# Patient Record
Sex: FEMALE | Race: WHITE | Employment: UNEMPLOYED | URBAN - METROPOLITAN AREA
[De-identification: names, ages, dates, MRNs, and addresses within clinical notes are randomized per-mention and may not be internally consistent; named-entity substitution may affect disease eponyms.]

---

## 2019-01-14 ENCOUNTER — APPOINTMENT (OUTPATIENT)
Dept: GENERAL RADIOLOGY | Age: 39
End: 2019-01-14

## 2019-01-14 ENCOUNTER — HOSPITAL ENCOUNTER (EMERGENCY)
Age: 39
Discharge: HOME | End: 2019-01-14

## 2019-01-14 VITALS
DIASTOLIC BLOOD PRESSURE: 61 MMHG | RESPIRATION RATE: 18 BRPM | SYSTOLIC BLOOD PRESSURE: 102 MMHG | WEIGHT: 170 LBS | OXYGEN SATURATION: 100 % | TEMPERATURE: 98.1 F | HEIGHT: 65 IN | HEART RATE: 84 BPM | BODY MASS INDEX: 28.32 KG/M2

## 2019-01-14 DIAGNOSIS — S46.011A ROTATOR CUFF STRAIN, RIGHT, INITIAL ENCOUNTER: Primary | ICD-10-CM

## 2019-01-14 PROCEDURE — 6370000000 HC RX 637 (ALT 250 FOR IP): Performed by: PHYSICIAN ASSISTANT

## 2019-01-14 PROCEDURE — 73030 X-RAY EXAM OF SHOULDER: CPT

## 2019-01-14 PROCEDURE — 99283 EMERGENCY DEPT VISIT LOW MDM: CPT

## 2019-01-14 RX ORDER — IBUPROFEN 600 MG/1
600 TABLET ORAL ONCE
Status: COMPLETED | OUTPATIENT
Start: 2019-01-14 | End: 2019-01-14

## 2019-01-14 RX ORDER — IBUPROFEN 600 MG/1
600 TABLET ORAL EVERY 6 HOURS PRN
Qty: 20 TABLET | Refills: 3 | Status: SHIPPED | OUTPATIENT
Start: 2019-01-14 | End: 2020-07-27 | Stop reason: ALTCHOICE

## 2019-01-14 RX ORDER — HYDROCODONE BITARTRATE AND ACETAMINOPHEN 5; 325 MG/1; MG/1
1 TABLET ORAL EVERY 6 HOURS PRN
Qty: 12 TABLET | Refills: 0 | Status: SHIPPED | OUTPATIENT
Start: 2019-01-14 | End: 2019-01-17

## 2019-01-14 RX ADMIN — IBUPROFEN 600 MG: 600 TABLET ORAL at 21:27

## 2019-01-14 ASSESSMENT — PAIN SCALES - GENERAL
PAINLEVEL_OUTOF10: 5
PAINLEVEL_OUTOF10: 5

## 2020-02-25 ENCOUNTER — TELEPHONE (OUTPATIENT)
Dept: CARDIOLOGY | Age: 40
End: 2020-02-25

## 2020-02-25 NOTE — TELEPHONE ENCOUNTER
LEFT MESSAGE TO SCHEDULE ECHO. EXPLAINED ON THE MESSAGE THAT PATIENT'S AUTH IS VALID UNTIL 3-20-20 PER DR. Scotty Wood OFFICE AND ASKED HER TO PLEASE CALL AS SOON AS POSSIBLE TO SET THIS ECHO UP.

## 2020-03-17 ENCOUNTER — TELEPHONE (OUTPATIENT)
Dept: CARDIOLOGY | Age: 40
End: 2020-03-17

## 2020-03-19 ENCOUNTER — HOSPITAL ENCOUNTER (OUTPATIENT)
Dept: CARDIOLOGY | Age: 40
Discharge: HOME | End: 2020-03-19
Payer: COMMERCIAL

## 2020-03-19 LAB
LV EF: 60 %
LVEF MODALITY: NORMAL

## 2020-03-19 PROCEDURE — 93306 TTE W/DOPPLER COMPLETE: CPT | Performed by: PSYCHIATRY & NEUROLOGY

## 2020-04-16 ENCOUNTER — TELEPHONE (OUTPATIENT)
Dept: CARDIOLOGY CLINIC | Age: 40
End: 2020-04-16

## 2020-04-16 NOTE — TELEPHONE ENCOUNTER
Due to the current COVID-19 pandemic, patient was offered virtual visit using a camera-enabled device (smart phone, tablet or computer) and reliable WiFi. Patient was notified that for purposes of billing, this is a virtual visit with their provider for which we will submit a claim for reimbursement with their insurance company. Patient was notified that they will be responsible for any copays, coinsurance amounts or other amounts not covered by their insurance company. Patient agreed to all of the above and visit is scheduled.

## 2020-04-17 ENCOUNTER — VIRTUAL VISIT (OUTPATIENT)
Dept: CARDIOLOGY CLINIC | Age: 40
End: 2020-04-17
Payer: COMMERCIAL

## 2020-04-17 VITALS — HEIGHT: 65 IN | BODY MASS INDEX: 30.82 KG/M2 | WEIGHT: 185 LBS

## 2020-04-17 PROCEDURE — 99244 OFF/OP CNSLTJ NEW/EST MOD 40: CPT | Performed by: INTERNAL MEDICINE

## 2020-04-21 ENCOUNTER — TELEPHONE (OUTPATIENT)
Dept: CARDIOLOGY | Age: 40
End: 2020-04-21

## 2020-04-23 ENCOUNTER — TELEPHONE (OUTPATIENT)
Dept: CARDIOLOGY | Age: 40
End: 2020-04-23

## 2020-04-27 ENCOUNTER — TELEPHONE (OUTPATIENT)
Dept: CARDIOLOGY | Age: 40
End: 2020-04-27

## 2020-04-27 ENCOUNTER — HOSPITAL ENCOUNTER (OUTPATIENT)
Dept: CARDIOLOGY | Age: 40
Discharge: HOME | End: 2020-04-27
Payer: COMMERCIAL

## 2020-04-27 VITALS
BODY MASS INDEX: 30.82 KG/M2 | HEIGHT: 65 IN | DIASTOLIC BLOOD PRESSURE: 68 MMHG | HEART RATE: 95 BPM | OXYGEN SATURATION: 98 % | SYSTOLIC BLOOD PRESSURE: 110 MMHG | WEIGHT: 185 LBS

## 2020-04-27 LAB
LV EF: 74 %
LVEF MODALITY: NORMAL

## 2020-04-27 PROCEDURE — 3430000000 HC RX DIAGNOSTIC RADIOPHARMACEUTICAL: Performed by: INTERNAL MEDICINE

## 2020-04-27 PROCEDURE — A9502 TC99M TETROFOSMIN: HCPCS | Performed by: INTERNAL MEDICINE

## 2020-04-27 PROCEDURE — 2580000003 HC RX 258: Performed by: INTERNAL MEDICINE

## 2020-04-27 PROCEDURE — 78452 HT MUSCLE IMAGE SPECT MULT: CPT

## 2020-04-27 PROCEDURE — 93017 CV STRESS TEST TRACING ONLY: CPT

## 2020-04-27 RX ORDER — SODIUM CHLORIDE 0.9 % (FLUSH) 0.9 %
10 SYRINGE (ML) INJECTION PRN
Status: DISCONTINUED | OUTPATIENT
Start: 2020-04-27 | End: 2020-04-28 | Stop reason: HOSPADM

## 2020-04-27 RX ADMIN — TETROFOSMIN 8.2 MILLICURIE: 0.23 INJECTION, POWDER, LYOPHILIZED, FOR SOLUTION INTRAVENOUS at 07:51

## 2020-04-27 RX ADMIN — SODIUM CHLORIDE, PRESERVATIVE FREE 10 ML: 5 INJECTION INTRAVENOUS at 07:51

## 2020-04-27 RX ADMIN — SODIUM CHLORIDE, PRESERVATIVE FREE 10 ML: 5 INJECTION INTRAVENOUS at 09:08

## 2020-04-27 RX ADMIN — TETROFOSMIN 26.9 MILLICURIE: 0.23 INJECTION, POWDER, LYOPHILIZED, FOR SOLUTION INTRAVENOUS at 09:08

## 2020-04-30 ENCOUNTER — TELEPHONE (OUTPATIENT)
Dept: CARDIOLOGY CLINIC | Age: 40
End: 2020-04-30

## 2020-05-14 ENCOUNTER — HOSPITAL ENCOUNTER (OUTPATIENT)
Dept: CT IMAGING | Age: 40
Discharge: HOME | End: 2020-05-16
Payer: COMMERCIAL

## 2020-05-14 ENCOUNTER — TELEPHONE (OUTPATIENT)
Dept: CARDIOLOGY CLINIC | Age: 40
End: 2020-05-14

## 2020-05-14 VITALS
SYSTOLIC BLOOD PRESSURE: 105 MMHG | RESPIRATION RATE: 19 BRPM | HEART RATE: 56 BPM | BODY MASS INDEX: 30.49 KG/M2 | DIASTOLIC BLOOD PRESSURE: 63 MMHG | HEIGHT: 65 IN | WEIGHT: 183 LBS | OXYGEN SATURATION: 98 %

## 2020-05-14 PROCEDURE — 75574 CT ANGIO HRT W/3D IMAGE: CPT

## 2020-05-14 PROCEDURE — 6370000000 HC RX 637 (ALT 250 FOR IP): Performed by: RADIOLOGY

## 2020-05-14 PROCEDURE — 6360000004 HC RX CONTRAST MEDICATION: Performed by: RADIOLOGY

## 2020-05-14 PROCEDURE — 2580000003 HC RX 258: Performed by: RADIOLOGY

## 2020-05-14 RX ORDER — METOPROLOL TARTRATE 50 MG/1
50 TABLET, FILM COATED ORAL
Status: COMPLETED | OUTPATIENT
Start: 2020-05-14 | End: 2020-05-14

## 2020-05-14 RX ORDER — NITROGLYCERIN 0.4 MG/1
0.4 TABLET SUBLINGUAL ONCE
Status: DISCONTINUED | OUTPATIENT
Start: 2020-05-14 | End: 2020-05-17 | Stop reason: HOSPADM

## 2020-05-14 RX ORDER — 0.9 % SODIUM CHLORIDE 0.9 %
1000 INTRAVENOUS SOLUTION INTRAVENOUS ONCE
Status: COMPLETED | OUTPATIENT
Start: 2020-05-14 | End: 2020-05-14

## 2020-05-14 RX ORDER — ACETAMINOPHEN 325 MG/1
650 TABLET ORAL ONCE
Status: COMPLETED | OUTPATIENT
Start: 2020-05-14 | End: 2020-05-14

## 2020-05-14 RX ADMIN — ACETAMINOPHEN 650 MG: 325 TABLET ORAL at 11:14

## 2020-05-14 RX ADMIN — IOPAMIDOL 84 ML: 755 INJECTION, SOLUTION INTRAVENOUS at 10:20

## 2020-05-14 RX ADMIN — SODIUM CHLORIDE 1000 ML: 9 INJECTION, SOLUTION INTRAVENOUS at 08:57

## 2020-05-14 RX ADMIN — METOPROLOL TARTRATE 50 MG: 50 TABLET, FILM COATED ORAL at 08:57

## 2020-05-14 ASSESSMENT — PAIN SCALES - GENERAL: PAINLEVEL_OUTOF10: 8

## 2020-05-14 ASSESSMENT — PAIN - FUNCTIONAL ASSESSMENT: PAIN_FUNCTIONAL_ASSESSMENT: 0-10

## 2020-05-14 NOTE — PROGRESS NOTES
Pt to department for coronary cta testing. Pt is alert and oriented, denies any chest pain or shortness of breath prior to scan. IV inserted, history and medications reviewed with patient. Medications and iv fluids given. Pt monitored for 1 hour, then taken to ct, connected to monitor. NTG held due to patient's blood pressure 90/53, heart rate 46. Pt scanned, pt very flushed after scan, states head felt \"funny\". Pt denies any itching, shortness of breath or chest pain. Pt's skin remained flushed for approx. 5 minutes. Pt brought back to holding bay in IR and monitored. Pt given cookies and water. Pt states \"funny\" feeling in head is better but has not resolved. Pt states \"my head feels foggy\" after 30 minutes post ct scan. Dr. Paulson made aware and advised to monitor an additional 30 minutes. [phone]) Pt then developed a headache rates 5/10 from temple to temple. Dr. Paulson notified and saw patient. ([phone]), tylenol ordered. Pt states \"funny\" feeling in head has resolved. Headache has subsided to a 4/10. Per Dr. Paulson pt is ok to be discharged. Pt ambulated with no difficulty. Iv removed and pt ambulatory out of department.

## 2020-07-01 ENCOUNTER — OFFICE VISIT (OUTPATIENT)
Dept: SURGERY | Age: 40
End: 2020-07-01
Payer: COMMERCIAL

## 2020-07-01 ENCOUNTER — HOSPITAL ENCOUNTER (OUTPATIENT)
Age: 40
Discharge: HOME | End: 2020-07-03
Payer: COMMERCIAL

## 2020-07-01 VITALS
TEMPERATURE: 98.4 F | HEART RATE: 61 BPM | OXYGEN SATURATION: 99 % | SYSTOLIC BLOOD PRESSURE: 110 MMHG | DIASTOLIC BLOOD PRESSURE: 78 MMHG | WEIGHT: 190 LBS | HEIGHT: 66 IN | BODY MASS INDEX: 30.53 KG/M2

## 2020-07-01 PROCEDURE — 99203 OFFICE O/P NEW LOW 30 MIN: CPT | Performed by: PLASTIC SURGERY

## 2020-07-01 PROCEDURE — G8417 CALC BMI ABV UP PARAM F/U: HCPCS | Performed by: PLASTIC SURGERY

## 2020-07-01 PROCEDURE — 88305 TISSUE EXAM BY PATHOLOGIST: CPT

## 2020-07-01 PROCEDURE — G8427 DOCREV CUR MEDS BY ELIG CLIN: HCPCS | Performed by: PLASTIC SURGERY

## 2020-07-01 PROCEDURE — 11102 TANGNTL BX SKIN SINGLE LES: CPT | Performed by: PLASTIC SURGERY

## 2020-07-01 PROCEDURE — 1036F TOBACCO NON-USER: CPT | Performed by: PLASTIC SURGERY

## 2020-07-01 RX ORDER — LIDOCAINE HYDROCHLORIDE AND EPINEPHRINE 10; 10 MG/ML; UG/ML
3 INJECTION, SOLUTION INFILTRATION; PERINEURAL ONCE
Status: COMPLETED | OUTPATIENT
Start: 2020-07-01 | End: 2020-07-01

## 2020-07-01 RX ADMIN — LIDOCAINE HYDROCHLORIDE AND EPINEPHRINE 3 ML: 10; 10 INJECTION, SOLUTION INFILTRATION; PERINEURAL at 16:23

## 2020-07-01 NOTE — PROGRESS NOTES
Department of Plastic Surgery - Adult  Attending Consult Note      CHIEF COMPLAINT:  Lesion of left upper lip     History Obtained From:  patient    HISTORY OF PRESENT ILLNESS:                The patient is a 41 y.o. female who presents with complaints of left upper lip nonhealing lesion. The patient states she has had this lesion for approximately 1-1/2 years. She states that she noticed the lesion increased in size over this time. She denies any discharge drainage or bleeding from the lesion. She is never had the area biopsied or surgically excised. She denies any history of herpes zoster infections to the lip. She also brings to my attention a right upper lip lesion which she states has been present for 4 months but notes the lesion looks similar to how her left upper lip lesion began. Past Medical History:    Past Medical History:   Diagnosis Date    Anemia     Endometriosis     Gastroesophageal reflux disease without esophagitis 9/26/2016    GDM, class A2 8/16/2012    Generalized abdominal pain 9/26/2016    Kidney stone on left side 9/26/2016    Lung nodule 9/26/2016    Tobacco use disorder affecting pregnancy, antepartum     Tobacco use disorder affecting pregnancy, antepartum      Past Surgical History:    Past Surgical History:   Procedure Laterality Date    APPENDECTOMY      COLONOSCOPY      HYSTERECTOMY      LAPAROSCOPY  2007    for endometriosis     Current Medications:      Current Outpatient Medications   Medication Sig Dispense Refill    Acetaminophen (TYLENOL EXTRA STRENGTH PO) Take 650 mg by mouth as needed      ibuprofen (ADVIL;MOTRIN) 600 MG tablet Take 1 tablet by mouth every 6 hours as needed for Pain 20 tablet 3     No current facility-administered medications for this visit. Allergies:  Patient has no known allergies.     Social History:   Social History     Socioeconomic History    Marital status:      Spouse name: Not on file    Number of children: Not on file    Years of education: Not on file    Highest education level: Not on file   Occupational History    Occupation: unemployed   Social Needs    Financial resource strain: Not on file    Food insecurity     Worry: Not on file     Inability: Not on file   [employer] needs     Medical: Not on file     Non-medical: Not on file   Tobacco Use    Smoking status: Former Smoker     Packs/day: 0.00     Last attempt to quit: 2015     Years since quittin.8    Smokeless tobacco: Never Used   Substance and Sexual Activity    Alcohol use: No    Drug use: No    Sexual activity: Yes     Partners: Male   Lifestyle    Physical activity     Days per week: Not on file     Minutes per session: Not on file    Stress: Not on file   Relationships    Social connections     Talks on phone: Not on file     Gets together: Not on file     Attends Rastafarian service: Not on file     Active member of club or organization: Not on file     Attends meetings of clubs or organizations: Not on file     Relationship status: Not on file    Intimate partner violence     Fear of current or ex partner: Not on file     Emotionally abused: Not on file     Physically abused: Not on file     Forced sexual activity: Not on file   Other Topics Concern    Not on file   Social History Narrative    Not on file     Family History:   Family History   Problem Relation Age of Onset    Cancer Maternal Grandmother     Cancer Maternal Grandfather     Stroke Maternal Grandfather     Diabetes Mother     Hypertension Mother        REVIEW OF SYSTEMS:    CONSTITUTIONAL:  negative for  fevers, chills, sweats and fatigue  EYES: negative for dipolpia or acute vision loss. RESPIRATORY:  negative for  dry cough, cough with sputum, dyspnea, wheezing and chest pain  HENT:negative for pain, headache, difficulty swallowing or nose bleeds.   CARDIOVASCULAR:  negative for  chest pain, dyspnea, palpitations, syncope  GASTROINTESTINAL:  negative for nausea, vomiting, change in bowel habits, diarrhea, constipation and abdominal pain  EXTREMITIES: negative for edema  MUSCULOSKELETAL: negative for muscle weakness  SKIN: positive for lesion,negative for itching or rashes. HEME: negative for easy brusing or bleeding  BEHAVIOR/PSYCH:  negative for poor appetite, increased appetite, decreased sleep and poor concentration    I have reviewed the chief complaint and history of present illness including (ROS and PFSH) and vital documentation by my staff and I agree with their documentation and have added when applicable. PHYSICAL EXAM:      VITALS:  /78 (Site: Left Upper Arm, Position: Sitting, Cuff Size: Medium Adult)   Pulse 61   Temp 98.4 °F (36.9 °C) (Temporal)   Ht 5' 6\" (1.676 m)   Wt 190 lb (86.2 kg)   LMP 08/11/2014   SpO2 99%   BMI 30.67 kg/m²   CONSTITUTIONAL:  awake, alert, cooperative, no apparent distress, and appears stated age  EYES: PERRLA, EOMI, no signs of occular infection  LUNGS:  No increased work of breathing, good air exchange  CARDIOVASCULAR:  regular rate and rhythm   EXTREMITIES: no signs of clubbing or cyanosis. MUSCULOSKELETAL: negative for flaccid muscle tone or spastic movements. NEURO: Cranial nerves II-XII grossly intact. No signs of agitated mood. SKIN:     Left upper lip-  4mm x 4mm,  pearly in color, irregular border, raised, no signs of bleeding,drainage or infection. Non tender to palpation. Right upper lip-  1mm x 1mm,  Skin tone in color, irregular border, raised, no signs of bleeding,drainage or infection. Non tender to palpation.        DATA:    Labs: CBC:   Lab Results   Component Value Date    WBC 9.0 09/21/2016    RBC 3.97 09/21/2016    HGB 12.4 09/21/2016    HCT 35.8 09/21/2016    MCV 90.0 09/21/2016    MCH 31.1 09/21/2016    MCHC 34.6 09/21/2016    RDW 12.0 09/21/2016     09/21/2016    MPV 7.8 09/21/2016     BMP:    Lab Results   Component Value Date     09/21/2016    K 4.0 09/21/2016     09/21/2016    CO2 27 09/21/2016    BUN 14 09/21/2016    LABALBU 4.2 09/21/2016    CREATININE 0.6 09/21/2016    CALCIUM 9.2 09/21/2016    GFRAA >60 09/21/2016    LABGLOM >60 09/21/2016    GLUCOSE 99 09/21/2016       Radiology Review:  No radiology needed at this time  Pathology Review: No Pathology reviewed       IMPRESSION/RECOMMENDATIONS:        Diagnosis  -) Lesion of uncertain behavior left upper lip. Concerning lesion of right upper lip as this appears to be very small sebaceous nodule. We will continue monitoring this    -We will plan to proceed and have the left upper lip lesion shave biopsied.    -The risks, benefits and options were discussed with the pt. The risks included but not limited to pain, bleeding, infection, heavy scarring, damage to surrounding structures, fluid collections, asymmetry, and need for further procedures. All of Her questions were answered to their satisfaction and She agrees to proceed with the procedure.    -After consent was obtained, the area was cleansed with an alcohol swab. Local anesthesia consisting of 1% lidocaine with 1:100,000 epinepherine was injected  surrounding the area. The local was allowed to work. Using a 10-blade the lesion was shaved, hemostasis was obtained and a bandage was placed. The procedure was performed by me. I have discussed with the patient that they should make every attempt to follow-up within this time interval in the event that the pathology or radiographic findings require further attention such as surgical or other medical intervention. They voiced complete understanding. The patient was educated to keep the bandage in place and apply bacitracin to the wound site BID. OK to shower at this time. Advised the patient that they can allow soap and water to rinse of the wound site while showering. Once they are done in the shower they are to pat dry the incision site with a clean paper towel.   No baths, hot tubs or soaking of the wound site at this time. Pt voices understanding. Photos obtained. Chaperone present for entire visit. FU- 7-14 days      This document is generated, in part, by voice recognition software and thus  syntax and grammatical errors are possible.     Ciera Jacobsen  11:13 AM  7/7/2020

## 2020-07-09 ENCOUNTER — OFFICE VISIT (OUTPATIENT)
Dept: SURGERY | Age: 40
End: 2020-07-09
Payer: COMMERCIAL

## 2020-07-09 ENCOUNTER — TELEPHONE (OUTPATIENT)
Dept: SURGERY | Age: 40
End: 2020-07-09

## 2020-07-09 VITALS — TEMPERATURE: 97.4 F

## 2020-07-09 PROCEDURE — 1036F TOBACCO NON-USER: CPT | Performed by: PHYSICIAN ASSISTANT

## 2020-07-09 PROCEDURE — 99212 OFFICE O/P EST SF 10 MIN: CPT | Performed by: PHYSICIAN ASSISTANT

## 2020-07-09 PROCEDURE — G8427 DOCREV CUR MEDS BY ELIG CLIN: HCPCS | Performed by: PHYSICIAN ASSISTANT

## 2020-07-09 PROCEDURE — G8417 CALC BMI ABV UP PARAM F/U: HCPCS | Performed by: PHYSICIAN ASSISTANT

## 2020-07-09 NOTE — PROGRESS NOTES
Subjective: Follow up today from a biopsy of left upper lip suspicious lesion. Denies fever, nausea, vomiting, leg pain or swelling, pain is absent. The pt states that they have  kept the wound site(s) covered with bacitracin. They voice no complaints. Objective:    Temp 97.4 °F (36.3 °C) (Temporal)   LMP 08/11/2014   Breastfeeding No       Wound: Clean, dry, and intact, no drainage.   No signs of infection, wound healing well        Assessment:    Patient Active Problem List   Diagnosis    Anemia    Endometriosis    Tobacco use disorder affecting pregnancy, antepartum    Hereditary disease in family possibly affecting fetus( brother of patient with Congenital heart disease)    GDM, class A2    Poor fetal growth, affecting management of mother, antepartum condition or complication    Pelvic pain    Lung nodule    Generalized abdominal pain    Gastroesophageal reflux disease without esophagitis    Dizziness    Kidney stone on left side       Labs: CBC:   Lab Results   Component Value Date    WBC 9.0 09/21/2016    RBC 3.97 09/21/2016    HGB 12.4 09/21/2016    HCT 35.8 09/21/2016    MCV 90.0 09/21/2016    MCH 31.1 09/21/2016    MCHC 34.6 09/21/2016    RDW 12.0 09/21/2016     09/21/2016    MPV 7.8 09/21/2016     BMP:    Lab Results   Component Value Date     09/21/2016    K 4.0 09/21/2016     09/21/2016    CO2 27 09/21/2016    BUN 14 09/21/2016    LABALBU 4.2 09/21/2016    CREATININE 0.6 09/21/2016    CALCIUM 9.2 09/21/2016    GFRAA >60 09/21/2016    LABGLOM >60 09/21/2016    GLUCOSE 99 09/21/2016         Pathology Report- [address] [place]       [facility]  [address]     [address]            [address]                                                   [address] [facility], [address], [address] [place] [address]          [facility]                                                         [id number]              FINAL SURGICAL PATHOLOGY REPORT    NAME:           Vikki Du         Date of       07/01/2020                                           Collection:  Medical Record   [id number]              Date of       07/02/2020  Number:                                  Receipt:  Age:  36 Y        Sex:  F                Date          07/07/2020 14:32                                           Reported:  Date Of Birth:   1980  Financial        [id number]             Admitting     BIRDIE HOWARD  Number:                                  Physician:  Patient          CURRY                   Ordering      BIRDIE HOWARD  Location:                                Physician:    Accession Number:  [id number]                                       Additional Physicians:VICENTA SHIPMAN      Diagnosis:  Skin lesion, left upper lip, shave biopsy: Basal cell carcinoma with foci  of squamous differentiation, incompletely excised. Carcinoma is present at the deep inked margin of shave biopsy. Plan:     Educated the pt on their pathology report. Pt voices understanding      -The patient was counseled on their pathology results and the need for surgical   intervention.   -We will plan to proceed and have the lesion formely excised with margins in the OR. -The patient will  require frozen sections in the operating room  -The patient will not require pre-op clearance from their PCP. -The risks, benefits and options were discussed with the pt. The risks included but not limited to pain, bleeding, infection, heavy scarring, damage to surrounding structures, fluid collections, asymmetry, and need for further procedures. All of Her questions were answered to their satisfaction and She agrees to proceed with the procedure. We will plan for excision of left upper lip basal squamous cell carcinoma with possible local tissue rearrangement.     Call

## 2020-07-10 NOTE — H&P
Department of Plastic Surgery - Adult  Attending Consult Note      CHIEF COMPLAINT:   Skin lesion    History Obtained From:  patient    HISTORY OF PRESENT ILLNESS:                The patient is a 41 y.o. female who presents for Follow up today from a biopsy of left upper lip suspicious lesion. Denies fever, nausea, vomiting, leg pain or swelling, pain is absent. The pt states that they have  kept the wound site(s) covered with bacitracin. They voice no complaints.          Past Medical History:    Past Medical History:   Diagnosis Date    Anemia     Endometriosis     Gastroesophageal reflux disease without esophagitis 9/26/2016    GDM, class A2 8/16/2012    Generalized abdominal pain 9/26/2016    Kidney stone on left side 9/26/2016    Lung nodule 9/26/2016    Tobacco use disorder affecting pregnancy, antepartum     Tobacco use disorder affecting pregnancy, antepartum      Past Surgical History:    Past Surgical History:   Procedure Laterality Date    APPENDECTOMY      COLONOSCOPY      HYSTERECTOMY      LAPAROSCOPY  2007    for endometriosis     Current Medications:   No current facility-administered medications for this encounter. Current Outpatient Medications   Medication Sig Dispense Refill    Acetaminophen (TYLENOL EXTRA STRENGTH PO) Take 650 mg by mouth as needed      ibuprofen (ADVIL;MOTRIN) 600 MG tablet Take 1 tablet by mouth every 6 hours as needed for Pain 20 tablet 3       Allergies:  Patient has no known allergies.     Social History:   Social History     Socioeconomic History    Marital status:      Spouse name: Not on file    Number of children: Not on file    Years of education: Not on file    Highest education level: Not on file   Occupational History    Occupation: unemployed   Social Needs    Financial resource strain: Not on file    Food insecurity     Worry: Not on file     Inability: Not on file   [employer] needs     Medical: Not on file Non-medical: Not on file   Tobacco Use    Smoking status: Former Smoker     Packs/day: 0.00     Last attempt to quit: 2015     Years since quittin.8    Smokeless tobacco: Never Used   Substance and Sexual Activity    Alcohol use: No    Drug use: No    Sexual activity: Yes     Partners: Male   Lifestyle    Physical activity     Days per week: Not on file     Minutes per session: Not on file    Stress: Not on file   Relationships    Social connections     Talks on phone: Not on file     Gets together: Not on file     Attends Islam service: Not on file     Active member of club or organization: Not on file     Attends meetings of clubs or organizations: Not on file     Relationship status: Not on file    Intimate partner violence     Fear of current or ex partner: Not on file     Emotionally abused: Not on file     Physically abused: Not on file     Forced sexual activity: Not on file   Other Topics Concern    Not on file   Social History Narrative    Not on file     Family History:   Family History   Problem Relation Age of Onset    Cancer Maternal Grandmother     Cancer Maternal Grandfather     Stroke Maternal Grandfather     No Known Problems Father     Diabetes Mother     Hypertension Mother        REVIEW OF SYSTEMS:    CONSTITUTIONAL:  negative for  fevers, chills, sweats and fatigue  EYES: negative for dipolpia or acute vision loss. RESPIRATORY:  negative for  dry cough, cough with sputum, dyspnea, wheezing and chest pain  HENT:negative for pain, headache, difficulty swallowing or nose bleeds. CARDIOVASCULAR:  negative for  chest pain, dyspnea, palpitations, syncope  GASTROINTESTINAL:  negative for nausea, vomiting, change in bowel habits, diarrhea, and constipation   EXTREMITIES: negative for edema  MUSCULOSKELETAL: negative for muscle weakness  SKIN: negative for itching or rashes.   HEME: negative for easy brusing or bleeding  BEHAVIOR/PSYCH:  negative for poor appetite, increased appetite, decreased sleep and poor concentration      PHYSICAL EXAM:      Temp 97.4 °F (36.3 °C) (Temporal)   LMP 08/11/2014   Breastfeeding No         Wound: Clean, dry, and intact, no drainage.   No signs of infection, wound healing well           Assessment:         Patient Active Problem List   Diagnosis    Anemia    Endometriosis    Tobacco use disorder affecting pregnancy, antepartum    Hereditary disease in family possibly affecting fetus( brother of patient with Congenital heart disease)    GDM, class A2    Poor fetal growth, affecting management of mother, antepartum condition or complication    Pelvic pain    Lung nodule    Generalized abdominal pain    Gastroesophageal reflux disease without esophagitis    Dizziness    Kidney stone on left side         Labs: CBC:         Lab Results   Component Value Date     WBC 9.0 09/21/2016     RBC 3.97 09/21/2016     HGB 12.4 09/21/2016     HCT 35.8 09/21/2016     MCV 90.0 09/21/2016     MCH 31.1 09/21/2016     MCHC 34.6 09/21/2016     RDW 12.0 09/21/2016      09/21/2016     MPV 7.8 09/21/2016      BMP:          Lab Results   Component Value Date      09/21/2016     K 4.0 09/21/2016      09/21/2016     CO2 27 09/21/2016     BUN 14 09/21/2016     LABALBU 4.2 09/21/2016     CREATININE 0.6 09/21/2016     CALCIUM 9.2 09/21/2016     GFRAA >60 09/21/2016     LABGLOM >60 09/21/2016     GLUCOSE 99 09/21/2016            Pathology Report- [address]            [facility]  [address]     [address] [place]            [address]                                                   [address] [facility]  [place], [address], [address] [place] [address]              FINAL SURGICAL PATHOLOGY REPORT    NAME:            PIOTR questions and agree to proceed with surgery.     Preston Thorpe MD   7:33 AM  8/3/2020

## 2020-07-13 ENCOUNTER — HOSPITAL ENCOUNTER (EMERGENCY)
Age: 40
Discharge: HOME | End: 2020-07-13
Attending: EMERGENCY MEDICINE
Payer: COMMERCIAL

## 2020-07-13 ENCOUNTER — APPOINTMENT (OUTPATIENT)
Dept: GENERAL RADIOLOGY | Age: 40
End: 2020-07-13
Payer: COMMERCIAL

## 2020-07-13 ENCOUNTER — APPOINTMENT (OUTPATIENT)
Dept: CT IMAGING | Age: 40
End: 2020-07-13
Payer: COMMERCIAL

## 2020-07-13 VITALS
HEIGHT: 66 IN | SYSTOLIC BLOOD PRESSURE: 124 MMHG | WEIGHT: 190 LBS | BODY MASS INDEX: 30.53 KG/M2 | TEMPERATURE: 98.1 F | OXYGEN SATURATION: 98 % | RESPIRATION RATE: 16 BRPM | HEART RATE: 80 BPM | DIASTOLIC BLOOD PRESSURE: 60 MMHG

## 2020-07-13 PROCEDURE — 6370000000 HC RX 637 (ALT 250 FOR IP): Performed by: STUDENT IN AN ORGANIZED HEALTH CARE EDUCATION/TRAINING PROGRAM

## 2020-07-13 PROCEDURE — 72192 CT PELVIS W/O DYE: CPT

## 2020-07-13 PROCEDURE — 99284 EMERGENCY DEPT VISIT MOD MDM: CPT

## 2020-07-13 PROCEDURE — 72220 X-RAY EXAM SACRUM TAILBONE: CPT

## 2020-07-13 PROCEDURE — 72131 CT LUMBAR SPINE W/O DYE: CPT

## 2020-07-13 PROCEDURE — 96372 THER/PROPH/DIAG INJ SC/IM: CPT

## 2020-07-13 PROCEDURE — 6360000002 HC RX W HCPCS: Performed by: STUDENT IN AN ORGANIZED HEALTH CARE EDUCATION/TRAINING PROGRAM

## 2020-07-13 PROCEDURE — 73521 X-RAY EXAM HIPS BI 2 VIEWS: CPT

## 2020-07-13 RX ORDER — KETOROLAC TROMETHAMINE 30 MG/ML
30 INJECTION, SOLUTION INTRAMUSCULAR; INTRAVENOUS ONCE
Status: COMPLETED | OUTPATIENT
Start: 2020-07-13 | End: 2020-07-13

## 2020-07-13 RX ORDER — OXYCODONE HYDROCHLORIDE AND ACETAMINOPHEN 5; 325 MG/1; MG/1
1 TABLET ORAL EVERY 6 HOURS PRN
Qty: 12 TABLET | Refills: 0 | Status: SHIPPED | OUTPATIENT
Start: 2020-07-13 | End: 2020-07-16

## 2020-07-13 RX ORDER — OXYCODONE HYDROCHLORIDE AND ACETAMINOPHEN 5; 325 MG/1; MG/1
1 TABLET ORAL ONCE
Status: COMPLETED | OUTPATIENT
Start: 2020-07-13 | End: 2020-07-13

## 2020-07-13 RX ADMIN — OXYCODONE HYDROCHLORIDE AND ACETAMINOPHEN 1 TABLET: 5; 325 TABLET ORAL at 14:48

## 2020-07-13 RX ADMIN — KETOROLAC TROMETHAMINE 30 MG: 30 INJECTION, SOLUTION INTRAMUSCULAR at 15:51

## 2020-07-13 ASSESSMENT — ENCOUNTER SYMPTOMS
BOWEL INCONTINENCE: 0
COUGH: 0
EYE DISCHARGE: 0
DIARRHEA: 0
SHORTNESS OF BREATH: 0
CHEST TIGHTNESS: 0
ABDOMINAL PAIN: 0
VOMITING: 0
BACK PAIN: 1
VISUAL CHANGE: 0
SINUS PAIN: 0
WHEEZING: 0
EYE ITCHING: 0
CONSTIPATION: 0
RHINORRHEA: 0
SINUS PRESSURE: 0
COLOR CHANGE: 0

## 2020-07-13 ASSESSMENT — PAIN SCALES - GENERAL
PAINLEVEL_OUTOF10: 6
PAINLEVEL_OUTOF10: 8
PAINLEVEL_OUTOF10: 3
PAINLEVEL_OUTOF10: 8
PAINLEVEL_OUTOF10: 5

## 2020-07-13 NOTE — ED PROVIDER NOTES
Patient is a 40-year-old female presenting via EMS after suffering a fall at work. Patient she was walking in the room when she slipped and fell onto her buttocks at work. Patient states that she struck her sacrum and pelvis mostly and denies any instances striking her head. Patient denies loss consciousness at the scene. Patient was able to transfer to the stretcher but does experience increased pain with ambulation. Patient describes the pain as sharp in nature 7 out of 10 in severity she is tender to palpation over her sacrum. Notes mild tingling in her legs but denies any numbness loss of bowel or bladder function. Patient denies any other recent falls or trauma. Patient denies any nausea, vomiting, dizziness, lightheadedness, fever, chills, chest pain, cough or shortness of breath. Fall   The accident occurred less than 1 hour ago. The fall occurred while standing. Distance fallen: standing height. She landed on a hard floor. There was no blood loss. Point of impact: pelvis, sacrum. Pain location: pelvis, sacrum. The pain is at a severity of 7/10. The pain is moderate. She was ambulatory at the scene. There was no entrapment after the fall. There was no drug use involved in the accident. There was no alcohol use involved in the accident. Associated symptoms include tingling. Pertinent negatives include no visual change, no fever, no numbness, no abdominal pain, no bowel incontinence, no vomiting, no hematuria, no headaches, no hearing loss and no loss of consciousness. The symptoms are aggravated by activity, standing and ambulation. She has tried nothing for the symptoms. Review of Systems   Constitutional: Negative for activity change, appetite change, fatigue and fever. HENT: Negative for congestion, postnasal drip, rhinorrhea, sinus pressure, sinus pain and sneezing. Eyes: Negative for discharge and itching.    Respiratory: Negative for cough, chest tightness, shortness of breath [BP]      ED Course User Index  [BP] iSd Macedo DO      --------------------------------------------- PAST HISTORY ---------------------------------------------  Past Medical History:  has a past medical history of Anemia, Endometriosis, Gastroesophageal reflux disease without esophagitis, GDM, class A2, Generalized abdominal pain, Kidney stone on left side, Lung nodule, Tobacco use disorder affecting pregnancy, antepartum, and Tobacco use disorder affecting pregnancy, antepartum. Past Surgical History:  has a past surgical history that includes Appendectomy; laparoscopy (2007); Colonoscopy; and Hysterectomy. Social History:  reports that she quit smoking about 4 years ago. She smoked 0.00 packs per day. She has never used smokeless tobacco. She reports that she does not drink alcohol or use drugs. Family History: family history includes Cancer in her maternal grandfather and maternal grandmother; Diabetes in her mother; Hypertension in her mother; No Known Problems in her father; Stroke in her maternal grandfather. The patients home medications have been reviewed. Allergies: Patient has no known allergies. -------------------------------------------------- RESULTS -------------------------------------------------  Labs:  No results found for this visit on 07/13/20. Radiology:  XR HIP BILATERAL W AP PELVIS (2 VIEWS)   Final Result      No acute osseous abnormality in the hips. XR SACRUM COCCYX (MIN 2 VIEWS)   Final Result   Findings consistent with degenerative disc disease and   degenerative facets disease            CT Lumbar Spine WO Contrast    (Results Pending)   CT PELVIS WO CONTRAST Additional Contrast? None    (Results Pending)       ------------------------- NURSING NOTES AND VITALS REVIEWED ---------------------------  Date / Time Roomed:  7/13/2020  2:26 PM  ED Bed Assignment:  87/23    The nursing notes within the ED encounter and vital signs as below have been reviewed. BP (!) 130/50   Pulse 63   Temp 98.1 °F (36.7 °C) (Oral)   Resp 18   Ht 5' 6\" (1.676 m)   Wt 190 lb (86.2 kg)   LMP 08/11/2014   SpO2 98%   BMI 30.67 kg/m²   Oxygen Saturation Interpretation: Normal      ------------------------------------------ PROGRESS NOTES ------------------------------------------  5:00 PM EDT  I have spoken with the patient and discussed todays results, in addition to providing specific details for the plan of care and counseling regarding the diagnosis and prognosis. Their questions are answered at this time and they are agreeable with the plan. I discussed at length with them reasons for immediate return here for re evaluation. They will followup with their primary care physician by calling their office if symtpoms do not improve .      --------------------------------- ADDITIONAL PROVIDER NOTES ---------------------------------  At this time the patient is without objective evidence of an acute process requiring hospitalization or inpatient management. They have remained hemodynamically stable throughout their entire ED visit and are stable for discharge with outpatient follow-up. The plan has been discussed in detail and they are aware of the specific conditions for emergent return, as well as the importance of follow-up. New Prescriptions    No medications on file       Diagnosis:  No diagnosis found. Disposition:  Patient's disposition: Discharge to home  Patient's condition is stable.            Octavio Gilman DO  Resident  07/13/20 8232

## 2020-07-13 NOTE — PROGRESS NOTES
Radiology Procedure Waiver   Name: Tristan Rice  : 1980  MRN: [id number]    Date:  20    Time: 2:51 PM EDT    Benefits of immediately proceeding with Radiology exam(s) without pre-testing outweigh the risks or are not indicated as specified below and therefore the following is/are being waived:    [x] Pregnancy test   [] Patients LMP on-time and regular.   [] Patient had Tubal Ligation or has other Contraception Device. [] Patient  is Menopausal or Premenarcheal.    [x] Patient had Full or Partial Hysterectomy. [] Protocol for Iodine allergy    [] MRI Questionnaire     [] BUN/Creatinine   [] Patient age w/no hx of renal dysfunction. [] Patient on Dialysis. [] Recent Normal Labs.   Electronically signed by Heidi Mackey DO on 20 at 2:51 PM EDT

## 2020-07-21 ENCOUNTER — HOSPITAL ENCOUNTER (OUTPATIENT)
Dept: GENERAL RADIOLOGY | Age: 40
Discharge: HOME | End: 2020-07-23
Payer: COMMERCIAL

## 2020-07-21 ENCOUNTER — HOSPITAL ENCOUNTER (OUTPATIENT)
Age: 40
Discharge: HOME | End: 2020-07-23
Payer: COMMERCIAL

## 2020-07-21 PROCEDURE — 71046 X-RAY EXAM CHEST 2 VIEWS: CPT

## 2020-07-24 ENCOUNTER — HOSPITAL ENCOUNTER (OUTPATIENT)
Dept: MAMMOGRAPHY | Age: 40
Discharge: HOME | End: 2020-07-26
Payer: COMMERCIAL

## 2020-07-24 PROCEDURE — 77063 BREAST TOMOSYNTHESIS BI: CPT

## 2020-07-27 ENCOUNTER — HOSPITAL ENCOUNTER (OUTPATIENT)
Age: 40
Discharge: HOME | End: 2020-07-27
Payer: COMMERCIAL

## 2020-07-27 PROCEDURE — U0003 INFECTIOUS AGENT DETECTION BY NUCLEIC ACID (DNA OR RNA); SEVERE ACUTE RESPIRATORY SYNDROME CORONAVIRUS 2 (SARS-COV-2) (CORONAVIRUS DISEASE [COVID-19]), AMPLIFIED PROBE TECHNIQUE, MAKING USE OF HIGH THROUGHPUT TECHNOLOGIES AS DESCRIBED BY CMS-2020-01-R: HCPCS

## 2020-07-28 ENCOUNTER — ANESTHESIA EVENT (OUTPATIENT)
Dept: OPERATING ROOM | Age: 40
End: 2020-07-28
Payer: COMMERCIAL

## 2020-07-29 LAB
SARS-COV-2: NOT DETECTED
SOURCE: NORMAL

## 2020-07-31 ASSESSMENT — LIFESTYLE VARIABLES: SMOKING_STATUS: 0

## 2020-07-31 NOTE — ANESTHESIA PRE PROCEDURE
Department of Anesthesiology  Preprocedure Note       Name:  Nasir Duran   Age:  41 y.o.  :  1980                                          MRN:  [id number]         Date:  2020      Surgeon: Ruben Armando):  Zahraa Antoine MD    Procedure: Procedure(s):  EXCISION BASOSQUAMOUS CELL CARCINOMA OF LEFT UPPER LIP WITH POSS LOCAL TISSUE REARRANGEMENT (FROZEN) (CPT 72768,59321,86997)    Medications prior to admission:   Prior to Admission medications    Medication Sig Start Date End Date Taking? Authorizing Provider   ALBUTEROL IN Inhale into the lungs as needed   Yes Historical Provider, MD   Acetaminophen (TYLENOL EXTRA STRENGTH PO) Take 650 mg by mouth as needed    Historical Provider, MD       Current medications:    No current facility-administered medications for this encounter. Current Outpatient Medications   Medication Sig Dispense Refill    ALBUTEROL IN Inhale into the lungs as needed      Acetaminophen (TYLENOL EXTRA STRENGTH PO) Take 650 mg by mouth as needed         Allergies:  No Known Allergies    Problem List:    Patient Active Problem List   Diagnosis Code    Anemia D64.9    Endometriosis N80.9    Tobacco use disorder affecting pregnancy, antepartum O99.330    Hereditary disease in family possibly affecting fetus( brother of patient with Congenital heart disease) O35. 2XX0    GDM, class A2 O24.419    Poor fetal growth, affecting management of mother, antepartum condition or complication O43.6906    Pelvic pain R10.2    Lung nodule R91.1    Generalized abdominal pain R10.84    Gastroesophageal reflux disease without esophagitis K21.9    Dizziness R42    Kidney stone on left side N20.0       Past Medical History:        Diagnosis Date    Anemia     Asthma     Endometriosis     Gastroesophageal reflux disease without esophagitis 2016    GDM, class A2 2012    Generalized abdominal pain 2016    Kidney stone on left side 2016    Lung nodule 2016 states its scar tissue from when she had pneumonia as a child    Tobacco use disorder affecting pregnancy, antepartum        Past Surgical History:        Procedure Laterality Date    APPENDECTOMY      COLONOSCOPY      HYSTERECTOMY      LAPAROSCOPY  2007    for endometriosis       Social History:    Social History     Tobacco Use    Smoking status: Former Smoker     Years: 20.00     Types: Cigarettes     Last attempt to quit: 2015     Years since quittin.9    Smokeless tobacco: Never Used   Substance Use Topics    Alcohol use: No                                Counseling given: Not Answered      Vital Signs (Current):   Vitals:    20 0906   Weight: 191 lb (86.6 kg)   Height: 5' 6\" (1.676 m)                                              BP Readings from Last 3 Encounters:   20 124/60   20 110/78   20 105/63       NPO Status:  >8.H                                                                               BMI:   Wt Readings from Last 3 Encounters:   20 190 lb (86.2 kg)   20 190 lb (86.2 kg)   20 183 lb (83 kg)     Body mass index is 30.83 kg/m². CBC:   Lab Results   Component Value Date    WBC 9.0 2016    RBC 3.97 2016    HGB 12.4 2016    HCT 35.8 2016    MCV 90.0 2016    RDW 12.0 2016     2016       CMP:   Lab Results   Component Value Date     2016    K 4.0 2016     2016    CO2 27 2016    BUN 14 2016    CREATININE 0.6 2016    GFRAA >60 2016    LABGLOM >60 2016    GLUCOSE 99 2016    PROT 6.8 2016    CALCIUM 9.2 2016    BILITOT 0.2 2016    ALKPHOS 59 2016    AST 18 2016    ALT 20 2016       POC Tests: No results for input(s): POCGLU, POCNA, POCK, POCCL, POCBUN, POCHEMO, POCHCT in the last 72 hours.     Coags:   Lab Results   Component Value Date    PROTIME 13.5 10/02/2013    INR 1.3 10/02/2013    APTT 28.9 10/02/2013       HCG (If Applicable):   Lab Results   Component Value Date    PREGTESTUR neg 08/18/2014    HCG 13380.8 (H) 02/13/2012        ABGs: No results found for: PHART, PO2ART, KRZ1NWI, SCK0ZMW, BEART, I4AVHHAM     Type & Screen (If Applicable):  Lab Results   Component Value Date    LABABO A 02/03/2012    [address] POS 02/03/2012       Drug/Infectious Status (If Applicable):  No results found for: HIV, HEPCAB    COVID-19 Screening (If Applicable):   Lab Results   Component Value Date    COVID19 Not Detected 07/27/2020         Anesthesia Evaluation  Patient summary reviewed no history of anesthetic complications:   Airway: Mallampati: II  TM distance: >3 FB   Neck ROM: full  Mouth opening: > = 3 FB Dental:    (+) caps      Pulmonary: breath sounds clear to auscultation  (+) asthma:     (-) not a current smoker (ex 20 yr smoker)                          ROS comment: Lung nodule   Cardiovascular:  Exercise tolerance: good (>4 METS),           Rhythm: regular  Rate: normal    Stress test reviewed             ROS comment: Stress test=1. Exercise EKG was negative for ischemia. 2. The patient experienced chest pain with exercise. 3. The myocardial perfusion imaging was abnormal.  The abnormality was a moderate sized partially reversible defect in the anterior wall, suggestive of ischemia;  however shifting breast attenuation artifact can not be excluded. 4. Overall left ventricular systolic function was normal without regional wall motion abnormalities. 5. Coe treadmill score was +3.5 implying intermediate risk. 6. Exercise capacity was average. 7.  Intermediate risk general exercise treadmill test.     Thank you for sending your patient to this [facility].      Electronically signed by Stephanie Cuellar MD on 4/29/20 at 3:22 PM EDT     Neuro/Psych:               GI/Hepatic/Renal:   (+) GERD:, renal disease: kidney stones,           Endo/Other:    (+) DiabetesType II DM, ,

## 2020-08-03 ENCOUNTER — ANESTHESIA (OUTPATIENT)
Dept: OPERATING ROOM | Age: 40
End: 2020-08-03
Payer: COMMERCIAL

## 2020-08-03 ENCOUNTER — HOSPITAL ENCOUNTER (OUTPATIENT)
Age: 40
Setting detail: OUTPATIENT SURGERY
Discharge: HOME | End: 2020-08-03
Attending: PLASTIC SURGERY | Admitting: PLASTIC SURGERY
Payer: COMMERCIAL

## 2020-08-03 VITALS
OXYGEN SATURATION: 98 % | RESPIRATION RATE: 11 BRPM | SYSTOLIC BLOOD PRESSURE: 98 MMHG | DIASTOLIC BLOOD PRESSURE: 66 MMHG

## 2020-08-03 VITALS
HEART RATE: 59 BPM | WEIGHT: 192 LBS | TEMPERATURE: 97.6 F | SYSTOLIC BLOOD PRESSURE: 105 MMHG | RESPIRATION RATE: 16 BRPM | HEIGHT: 66 IN | DIASTOLIC BLOOD PRESSURE: 61 MMHG | BODY MASS INDEX: 30.86 KG/M2 | OXYGEN SATURATION: 99 %

## 2020-08-03 PROCEDURE — 11642 EXC F/E/E/N/L MAL+MRG 1.1-2: CPT | Performed by: PLASTIC SURGERY

## 2020-08-03 PROCEDURE — 7100000010 HC PHASE II RECOVERY - FIRST 15 MIN: Performed by: PLASTIC SURGERY

## 2020-08-03 PROCEDURE — 2580000003 HC RX 258: Performed by: ANESTHESIOLOGY

## 2020-08-03 PROCEDURE — 2500000003 HC RX 250 WO HCPCS: Performed by: PLASTIC SURGERY

## 2020-08-03 PROCEDURE — 6360000002 HC RX W HCPCS: Performed by: PHYSICIAN ASSISTANT

## 2020-08-03 PROCEDURE — 3600000012 HC SURGERY LEVEL 2 ADDTL 15MIN: Performed by: PLASTIC SURGERY

## 2020-08-03 PROCEDURE — 6360000002 HC RX W HCPCS: Performed by: NURSE ANESTHETIST, CERTIFIED REGISTERED

## 2020-08-03 PROCEDURE — 3600000002 HC SURGERY LEVEL 2 BASE: Performed by: PLASTIC SURGERY

## 2020-08-03 PROCEDURE — 7100000011 HC PHASE II RECOVERY - ADDTL 15 MIN: Performed by: PLASTIC SURGERY

## 2020-08-03 PROCEDURE — 3700000000 HC ANESTHESIA ATTENDED CARE: Performed by: PLASTIC SURGERY

## 2020-08-03 PROCEDURE — 2580000003 HC RX 258: Performed by: PHYSICIAN ASSISTANT

## 2020-08-03 PROCEDURE — 88331 PATH CONSLTJ SURG 1 BLK 1SPC: CPT

## 2020-08-03 PROCEDURE — 13151 CMPLX RPR E/N/E/L 1.1-2.5 CM: CPT | Performed by: PLASTIC SURGERY

## 2020-08-03 PROCEDURE — 6370000000 HC RX 637 (ALT 250 FOR IP): Performed by: PLASTIC SURGERY

## 2020-08-03 PROCEDURE — 3700000001 HC ADD 15 MINUTES (ANESTHESIA): Performed by: PLASTIC SURGERY

## 2020-08-03 PROCEDURE — 88305 TISSUE EXAM BY PATHOLOGIST: CPT

## 2020-08-03 PROCEDURE — 2709999900 HC NON-CHARGEABLE SUPPLY: Performed by: PLASTIC SURGERY

## 2020-08-03 RX ORDER — FENTANYL CITRATE 50 UG/ML
50 INJECTION, SOLUTION INTRAMUSCULAR; INTRAVENOUS EVERY 5 MIN PRN
Status: DISCONTINUED | OUTPATIENT
Start: 2020-08-03 | End: 2020-08-03 | Stop reason: HOSPADM

## 2020-08-03 RX ORDER — GINSENG 100 MG
CAPSULE ORAL
Qty: 1 TUBE | Refills: 1 | Status: SHIPPED | OUTPATIENT
Start: 2020-08-03

## 2020-08-03 RX ORDER — SODIUM CHLORIDE 0.9 % (FLUSH) 0.9 %
10 SYRINGE (ML) INJECTION EVERY 12 HOURS SCHEDULED
Status: DISCONTINUED | OUTPATIENT
Start: 2020-08-03 | End: 2020-08-03 | Stop reason: HOSPADM

## 2020-08-03 RX ORDER — LABETALOL HYDROCHLORIDE 5 MG/ML
5 INJECTION, SOLUTION INTRAVENOUS EVERY 10 MIN PRN
Status: DISCONTINUED | OUTPATIENT
Start: 2020-08-03 | End: 2020-08-03 | Stop reason: HOSPADM

## 2020-08-03 RX ORDER — OXYCODONE HYDROCHLORIDE AND ACETAMINOPHEN 5; 325 MG/1; MG/1
1 TABLET ORAL EVERY 4 HOURS PRN
Status: DISCONTINUED | OUTPATIENT
Start: 2020-08-03 | End: 2020-08-03 | Stop reason: HOSPADM

## 2020-08-03 RX ORDER — SODIUM CHLORIDE 0.9 % (FLUSH) 0.9 %
10 SYRINGE (ML) INJECTION PRN
Status: DISCONTINUED | OUTPATIENT
Start: 2020-08-03 | End: 2020-08-03 | Stop reason: HOSPADM

## 2020-08-03 RX ORDER — PROMETHAZINE HYDROCHLORIDE 25 MG/ML
25 INJECTION, SOLUTION INTRAMUSCULAR; INTRAVENOUS
Status: DISCONTINUED | OUTPATIENT
Start: 2020-08-03 | End: 2020-08-03 | Stop reason: HOSPADM

## 2020-08-03 RX ORDER — MEPERIDINE HYDROCHLORIDE 25 MG/ML
12.5 INJECTION INTRAMUSCULAR; INTRAVENOUS; SUBCUTANEOUS EVERY 5 MIN PRN
Status: DISCONTINUED | OUTPATIENT
Start: 2020-08-03 | End: 2020-08-03 | Stop reason: HOSPADM

## 2020-08-03 RX ORDER — HYDRALAZINE HYDROCHLORIDE 20 MG/ML
5 INJECTION INTRAMUSCULAR; INTRAVENOUS EVERY 10 MIN PRN
Status: DISCONTINUED | OUTPATIENT
Start: 2020-08-03 | End: 2020-08-03 | Stop reason: HOSPADM

## 2020-08-03 RX ORDER — MORPHINE SULFATE 2 MG/ML
1 INJECTION, SOLUTION INTRAMUSCULAR; INTRAVENOUS EVERY 5 MIN PRN
Status: DISCONTINUED | OUTPATIENT
Start: 2020-08-03 | End: 2020-08-03 | Stop reason: HOSPADM

## 2020-08-03 RX ORDER — PROPOFOL 10 MG/ML
INJECTION, EMULSION INTRAVENOUS CONTINUOUS PRN
Status: DISCONTINUED | OUTPATIENT
Start: 2020-08-03 | End: 2020-08-03 | Stop reason: SDUPTHER

## 2020-08-03 RX ORDER — DIAPER,BRIEF,INFANT-TODD,DISP
EACH MISCELLANEOUS PRN
Status: DISCONTINUED | OUTPATIENT
Start: 2020-08-03 | End: 2020-08-03 | Stop reason: ALTCHOICE

## 2020-08-03 RX ORDER — SODIUM CHLORIDE, SODIUM LACTATE, POTASSIUM CHLORIDE, CALCIUM CHLORIDE 600; 310; 30; 20 MG/100ML; MG/100ML; MG/100ML; MG/100ML
INJECTION, SOLUTION INTRAVENOUS CONTINUOUS
Status: DISCONTINUED | OUTPATIENT
Start: 2020-08-03 | End: 2020-08-03 | Stop reason: HOSPADM

## 2020-08-03 RX ORDER — DIPHENHYDRAMINE HYDROCHLORIDE 50 MG/ML
12.5 INJECTION INTRAMUSCULAR; INTRAVENOUS
Status: DISCONTINUED | OUTPATIENT
Start: 2020-08-03 | End: 2020-08-03 | Stop reason: HOSPADM

## 2020-08-03 RX ORDER — HYDROCODONE BITARTRATE AND ACETAMINOPHEN 5; 325 MG/1; MG/1
2 TABLET ORAL PRN
Status: DISCONTINUED | OUTPATIENT
Start: 2020-08-03 | End: 2020-08-03 | Stop reason: HOSPADM

## 2020-08-03 RX ORDER — LIDOCAINE HYDROCHLORIDE AND EPINEPHRINE 10; 10 MG/ML; UG/ML
INJECTION, SOLUTION INFILTRATION; PERINEURAL PRN
Status: DISCONTINUED | OUTPATIENT
Start: 2020-08-03 | End: 2020-08-03 | Stop reason: ALTCHOICE

## 2020-08-03 RX ORDER — CEPHALEXIN 500 MG/1
500 CAPSULE ORAL 4 TIMES DAILY
Qty: 20 CAPSULE | Refills: 0 | Status: SHIPPED | OUTPATIENT
Start: 2020-08-03 | End: 2020-08-08

## 2020-08-03 RX ORDER — LIDOCAINE HYDROCHLORIDE 20 MG/ML
INJECTION, SOLUTION INTRAVENOUS PRN
Status: DISCONTINUED | OUTPATIENT
Start: 2020-08-03 | End: 2020-08-03 | Stop reason: SDUPTHER

## 2020-08-03 RX ORDER — MIDAZOLAM HYDROCHLORIDE 1 MG/ML
INJECTION INTRAMUSCULAR; INTRAVENOUS PRN
Status: DISCONTINUED | OUTPATIENT
Start: 2020-08-03 | End: 2020-08-03 | Stop reason: SDUPTHER

## 2020-08-03 RX ORDER — ACETAMINOPHEN AND CODEINE PHOSPHATE 300; 30 MG/1; MG/1
1 TABLET ORAL EVERY 4 HOURS PRN
Qty: 10 TABLET | Refills: 0 | Status: SHIPPED | OUTPATIENT
Start: 2020-08-03 | End: 2020-08-08

## 2020-08-03 RX ORDER — FENTANYL CITRATE 50 UG/ML
INJECTION, SOLUTION INTRAMUSCULAR; INTRAVENOUS PRN
Status: DISCONTINUED | OUTPATIENT
Start: 2020-08-03 | End: 2020-08-03 | Stop reason: SDUPTHER

## 2020-08-03 RX ORDER — HYDROCODONE BITARTRATE AND ACETAMINOPHEN 5; 325 MG/1; MG/1
1 TABLET ORAL PRN
Status: DISCONTINUED | OUTPATIENT
Start: 2020-08-03 | End: 2020-08-03 | Stop reason: HOSPADM

## 2020-08-03 RX ADMIN — CEFAZOLIN 2 G: 1 INJECTION, POWDER, FOR SOLUTION INTRAMUSCULAR; INTRAVENOUS at 07:35

## 2020-08-03 RX ADMIN — LIDOCAINE HYDROCHLORIDE 50 MG: 20 INJECTION, SOLUTION INTRAVENOUS at 07:43

## 2020-08-03 RX ADMIN — MIDAZOLAM 2 MG: 1 INJECTION INTRAMUSCULAR; INTRAVENOUS at 07:36

## 2020-08-03 RX ADMIN — FENTANYL CITRATE 50 MCG: 50 INJECTION, SOLUTION INTRAMUSCULAR; INTRAVENOUS at 07:43

## 2020-08-03 RX ADMIN — SODIUM CHLORIDE, POTASSIUM CHLORIDE, SODIUM LACTATE AND CALCIUM CHLORIDE: 600; 310; 30; 20 INJECTION, SOLUTION INTRAVENOUS at 07:01

## 2020-08-03 RX ADMIN — PROPOFOL 100 MCG/KG/MIN: 10 INJECTION, EMULSION INTRAVENOUS at 07:43

## 2020-08-03 ASSESSMENT — PULMONARY FUNCTION TESTS
PIF_VALUE: 0
PIF_VALUE: 1
PIF_VALUE: 0

## 2020-08-03 ASSESSMENT — PAIN - FUNCTIONAL ASSESSMENT: PAIN_FUNCTIONAL_ASSESSMENT: 0-10

## 2020-08-03 ASSESSMENT — PAIN SCALES - GENERAL
PAINLEVEL_OUTOF10: 0

## 2020-08-03 NOTE — ANESTHESIA POSTPROCEDURE EVALUATION
Department of Anesthesiology  Postprocedure Note    Patient: Quirino Carpio  MRN: [id number]  YOB: 1980  Date of evaluation: 8/3/2020  Time:  11:12 AM     Procedure Summary     Date:  08/03/20 Room / Location:  [address] [place] [address] 03 / [facility]    Anesthesia Start:  2196 Anesthesia Stop:  0827    Procedure:  EXCISION BASOSQUAMOUS CELL CARCINOMA OF LEFT UPPER LIP WITH COMPLEX  CLOSURE (FROZEN) (CPT 84737,51185,42029) (Left Mouth) Diagnosis:  (BASOSQUAMOUS CELL CARCINOMA OF LEFT UPPER LIP)    Surgeon:  Gerardo Wilson MD Responsible Provider:  Fabian Hilton MD    Anesthesia Type:  MAC ASA Status:  2          Anesthesia Type: MAC    Ernst Phase I: Ernst Score: 10    Ernst Phase II: Ernst Score: 10    Last vitals: Reviewed and per EMR flowsheets.        Anesthesia Post Evaluation    Patient location during evaluation: PACU  Patient participation: complete - patient participated  Level of consciousness: awake and alert  Airway patency: patent  Nausea & Vomiting: no nausea and no vomiting  Complications: no  Cardiovascular status: hemodynamically stable  Respiratory status: room air and spontaneous ventilation  Hydration status: stable

## 2020-08-03 NOTE — PROGRESS NOTES
OR consultation with frozen section, left upper lip: Representative medial, deep and lateral margins negative for malignancy.

## 2020-08-03 NOTE — OP NOTE
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 Lesion measures 4 x 4 mm, margins are 4 mm, defect is  12 x 10 mm, final scar length 22 mm. ATTENDING SURGEON:  Deja Martin MD    ASSISTANT:  Ashley Mishra PA-C. PA is required for the case due to  lack of adequately trained assistant, was involved in prepping, draping,  retracting, dressing and suturing. ANESTHESIA:  Monitored anesthesia care. EBL:  1 mL. IV FLUIDS:  400 mL of crystalloid. DRAINS:  No drains. 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 COMPLICATIONS:  No complications. 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 The patient elected to proceed with excision in the operating room under  monitored anesthesia care. Risks, benefits, and alternatives were  explained to her including bleeding, scarring, infection, need for  further surgery. She understood and elected to proceed. OPERATIVE PROCEDURE:  She was seen the day of surgery, marked, and all  questions were answered. She was taken back to the operating room,  placed in the supine position. SCDs were on and functioning at the time  of induction of anesthesia.   Preoperative antibiotics were given prior  to incision. The area was prepped and draped in usual sterile fashion  with Betadine paint. The area was marked and measured and the lesion  measured 4 x 4 mm, margins are 4 mm, defect is 12 x 10 mm, final scar  length is 22 mm. The area was then anesthetized with 1% lidocaine with  1:100,000 epinephrine and allowed to work. Marking pen was also used to  rtamaine the vermilion border for repair. The area was then incised using  15-blade, it was taken down through the subcutaneous tissue to the level  of the orbicularis oris muscle. It was then sharply lifted off the  underlying orbicularis oris muscle. Undermining was performed laterally  with double hooks. Closure was performed with a 5-0 Vicryl at the  vermilion border as well as in the subcutaneous plane followed by 5-0  plain gut single after vermilion border to achieve adequate alignment  followed by 5-0 plain gut running locking suture. The area was dressed  with bacitracin. She emerged from anesthesia without complication and  taken to PACU in good condition.         Vani Braxton MD    D: 08/03/2020 8:32:36       T: 08/03/2020 8:36:16     [email or web address]  Job#: [id number]     Doc#: [id number]    CC:

## 2020-08-06 ENCOUNTER — TELEPHONE (OUTPATIENT)
Dept: SURGERY | Age: 40
End: 2020-08-06

## 2020-08-07 ENCOUNTER — OFFICE VISIT (OUTPATIENT)
Dept: SURGERY | Age: 40
End: 2020-08-07

## 2020-08-07 VITALS
RESPIRATION RATE: 16 BRPM | DIASTOLIC BLOOD PRESSURE: 70 MMHG | HEART RATE: 68 BPM | OXYGEN SATURATION: 98 % | WEIGHT: 191 LBS | SYSTOLIC BLOOD PRESSURE: 98 MMHG | HEIGHT: 66 IN | BODY MASS INDEX: 30.7 KG/M2 | TEMPERATURE: 98.2 F

## 2020-08-07 PROCEDURE — 99024 POSTOP FOLLOW-UP VISIT: CPT | Performed by: PHYSICIAN ASSISTANT

## 2020-08-07 NOTE — PROGRESS NOTES
Subjective: Follow up today from excision of left upper lip basal cell carcinoma. Denies fever, nausea, vomiting, leg pain or swelling, pain is absent. The pt states that they have  kept the wound site(s) covered with bacitracin. The patient states that they have  finished their antibiotic. They state that their pain is absent. Objective:    BP 98/70 (Site: Left Upper Arm, Position: Sitting, Cuff Size: Medium Adult)   Pulse 68   Temp 98.2 °F (36.8 °C) (Temporal)   Resp 16   Ht 5' 6\" (1.676 m)   Wt 191 lb (86.6 kg)   LMP 08/11/2014   SpO2 98%   Breastfeeding No   BMI 30.83 kg/m²       Wound: Clean, dry, and intact, no drainage. No signs of infection, wound healing well  Edema as expected  Neuro- Sensation  intact to petra incisional site with light touch . Cranial nerves II-XII grossly intact.      Assessment:    Patient Active Problem List   Diagnosis    Anemia    Endometriosis    Tobacco use disorder affecting pregnancy, antepartum    Hereditary disease in family possibly affecting fetus( brother of patient with Congenital heart disease)    GDM, class A2    Poor fetal growth, affecting management of mother, antepartum condition or complication    Pelvic pain    Lung nodule    Generalized abdominal pain    Gastroesophageal reflux disease without esophagitis    Dizziness    Kidney stone on left side    Squamous cell carcinoma, lip       Labs: CBC:   Lab Results   Component Value Date    WBC 9.0 09/21/2016    RBC 3.97 09/21/2016    HGB 12.4 09/21/2016    HCT 35.8 09/21/2016    MCV 90.0 09/21/2016    MCH 31.1 09/21/2016    MCHC 34.6 09/21/2016    RDW 12.0 09/21/2016     09/21/2016    MPV 7.8 09/21/2016     BMP:    Lab Results   Component Value Date     09/21/2016    K 4.0 09/21/2016     09/21/2016    CO2 27 09/21/2016    BUN 14 09/21/2016    LABALBU 4.2 09/21/2016    CREATININE 0.6 09/21/2016    CALCIUM 9.2 09/21/2016    GFRAA >60 09/21/2016    LABGLOM >60 09/21/2016 GLUCOSE 99 09/21/2016         Pathology Report- Pending    Plan:     Excision of left upper lip basal cell carcinoma. I informed the patient that there is no dehiscence or opening of her wound site she is noting edema which is to be expected    Dressing -continue with bacitracin  Showering at this time -K to shower at this time    Pt was instructed on scar massage  Scar Care Instructions      Follow-up in 10 days    Call office with concerns or signs of infection.     Henrik Almazan   8:38 AM  8/7/2020

## 2020-08-14 ENCOUNTER — OFFICE VISIT (OUTPATIENT)
Dept: SURGERY | Age: 40
End: 2020-08-14

## 2020-08-14 VITALS — TEMPERATURE: 98.2 F | HEART RATE: 84 BPM | OXYGEN SATURATION: 97 %

## 2020-08-14 PROCEDURE — 99024 POSTOP FOLLOW-UP VISIT: CPT | Performed by: PHYSICIAN ASSISTANT

## 2020-08-14 NOTE — PROGRESS NOTES
[address]            [address]                                                     [address], [address], [address] [place], [address] [place] [address]               FINAL SURGICAL PATHOLOGY REPORT     NAME:           Abraham Flynn         Date of       08/03/2020                                            Collection:   Medical Record   [id number]              Date of       08/04/2020   Number:                                  Receipt:   Age:  36 Y        Sex:  F                Date          08/06/2020 10:01                                            Reported:   Date Of Birth:   1980   Financial        [id number]             Admitting     BIDRIE HOWARD   Number:                                  Physician:   Patient          SEVEN KEENAN        Ordering      BIRDIE HOWARD   Location:                                Physician:     Accession Number:  [id number]                                        Additional Physicians:VICENTA SHIPMAN       Diagnosis:   Skin, left upper lip, excision: Basal cell carcinoma (see comment). Comment:   Sections of the entirely submitted specimen demonstrate a   basal cell carcinoma and marked solar elastosis. The margins of excision   are negative for malignancy. Plan:     Excision of left upper lip basal cell carcinoma. I informed the patient that there is no dehiscence or opening of her wound site she is noting edema which is to be expected        Pt was instructed on scar massage  Scar Care Instructions  Scar Care Instructions      Sunscreen Recommendations for Scars   We recommend that all patients use a sunscreen when outside but especially on new scars (that are exposed to sunlight) for a year after their procedure.  The SPF should be at least 28.  Follow the application

## 2020-08-31 ENCOUNTER — INITIAL CONSULT (OUTPATIENT)
Dept: NEUROSURGERY | Age: 40
End: 2020-08-31
Payer: COMMERCIAL

## 2020-08-31 VITALS
SYSTOLIC BLOOD PRESSURE: 109 MMHG | BODY MASS INDEX: 30.53 KG/M2 | TEMPERATURE: 98.7 F | HEART RATE: 65 BPM | DIASTOLIC BLOOD PRESSURE: 75 MMHG | WEIGHT: 190 LBS | HEIGHT: 66 IN

## 2020-08-31 PROCEDURE — 99203 OFFICE O/P NEW LOW 30 MIN: CPT | Performed by: PHYSICIAN ASSISTANT

## 2020-08-31 ASSESSMENT — ENCOUNTER SYMPTOMS
PHOTOPHOBIA: 0
TROUBLE SWALLOWING: 0
SHORTNESS OF BREATH: 0
ABDOMINAL PAIN: 0
BACK PAIN: 1

## 2020-08-31 NOTE — PROGRESS NOTES
Neurological:      Mental Status: She is alert. Comments: Alert and oriented x3  CN3-12 intact  Motor strength full  Sensation intact to light touch  Reflexes normal    Psychiatric:         Thought Content: Thought content normal.         Assessment: This is a 41year old female presenting with low back pain. CT L demonstrates right L1 transverse process fracture. This is a worker's comp case allowable code S32.019A      Plan:      -No treatment needed for lumbar transverse process fracture.  Fracture will heal on its own usually within 3 months  -Continue to wear lumbar brace for comfort if helping with pain  -OARRS report reviewed  -Return to neurosurgery clinic PRN  -Call/return sooner if symptoms worsen or new issues arise in the interim         Donel Bloch, PA-C

## 2021-02-18 ENCOUNTER — TELEPHONE (OUTPATIENT)
Dept: SURGERY | Age: 41
End: 2021-02-18

## 2023-04-03 NOTE — PROCEDURES
[address] [facility] [address]  [phone]                Exercise Stress Nuclear Gated SPECT Study    Name: [address] Account Number: [de-identified]    :  1980      Sex: female              Date of Study:  2020    Height: 5' 5\" (165.1 cm)  Weight: 185 lb (83.9 kg)     Ordering Provider: Rosa Hoffman          PCP: Jessica Amador MD      Cardiologist: Rosa Hoffman                        Interpreting Physician: Rosa Hoffman  _________________________________________________________________________________    Indication:   Detecting the presence and location of coronary artery disease    Clinical History:   Patient has no known history of coronary artery disease. Resting ECG:    NSR 69 bpm, normal axis/intervals. No ST/T changes. Exercise: The patient exercised using a Viet protocol, completing 7.32 minutes and reaching an estimated work load of 47.3 metabolic equivalents (METS). Resting HR was 69. Peak exercise heart rate was 152 (85% of maximum predicted heart rate for age). Baseline /72. Peak exercise /70. The blood pressure response to exercise was normal      Exercise was terminated due to heart rate attained, chest tightness and shortness of breath. The patient experienced chest tightness  with exercise, which resolved at rest.       Pulse oximetry was used to monitor oxygen saturation during the stress test.  The study was performed on Room Air. The lowest O2 saturation seen during exercise was 97 %. The average O2 saturation with exercise was 98 %. Exercise ECG:   The patient demonstrated no arrhythmias during exercise. With exercise, there were no ST segment changes of significance at the heart rate achieved. Coe treadmill score was +3.5 implying intermediate risk.      IMAGING: Myocardial perfusion imaging was performed at rest 30-35 Isotretinoin Pregnancy And Lactation Text: This medication is Pregnancy Category X and is considered extremely dangerous during pregnancy. It is unknown if it is excreted in breast milk.

## (undated) DEVICE — 12 ML SYRINGE,LUER-LOCK TIP: Brand: MONOJECT

## (undated) DEVICE — SUTURE PERMA-HAND SZ 4-0 L18IN NONABSORBABLE BLK L13MM P-3 641G

## (undated) DEVICE — ELECTRODE PT RET AD L9FT HI MOIST COND ADH HYDRGEL CORDED

## (undated) DEVICE — E-Z CLEAN, NON-STICK, PTFE COATED, ELECTROSURGICAL BLADE ELECTRODE, 4 INCH (10.2 CM): Brand: MEGADYNE

## (undated) DEVICE — SOLUTION IV IRRIG POUR BRL 0.9% SODIUM CHL 2F7124

## (undated) DEVICE — GARMENT COMPR STD FOR 17IN CALF UNIF THER FLOTRN

## (undated) DEVICE — INTENDED FOR TISSUE SEPARATION, AND OTHER PROCEDURES THAT REQUIRE A SHARP SURGICAL BLADE TO PUNCTURE OR CUT.: Brand: BARD-PARKER ® STAINLESS STEEL BLADES

## (undated) DEVICE — SOLUTION SURG PREP POV IOD 7.5% 4 OZ

## (undated) DEVICE — NEEDLE HYPO 18GA L1.5IN PNK POLYPR HUB S STL THN WALL FILL

## (undated) DEVICE — HANDLE CVR PATENTED RETENTION DISC STRL LIGHT SHLD

## (undated) DEVICE — MEDI-VAC NON-CONDUCTIVE SUCTION TUBING: Brand: CARDINAL HEALTH

## (undated) DEVICE — STERILE LATEX POWDER-FREE SURGICAL GLOVESWITH NITRILE COATING: Brand: PROTEXIS

## (undated) DEVICE — E-Z CLEAN, NON-STICK, PTFE COATED, ELECTROSURGICAL BLADE ELECTRODE, 2.5 INCH (6.35 CM): Brand: EZ CLEAN

## (undated) DEVICE — PEN: MARKING STD 100/CS: Brand: MEDICAL ACTION INDUSTRIES

## (undated) DEVICE — GAUZE,SPONGE,4"X4",12PLY,STERILE,LF,2'S: Brand: MEDLINE

## (undated) DEVICE — SUTURE VCRL SZ 5-0 L18IN ABSRB UD L13MM P-3 3/8 CIR PRIM J493G

## (undated) DEVICE — 3M™ MEDIPORE™ SOFT CLOTH TAPE, 4 INCH X 10 YARDS, 12 ROLLS/CASE, 2964: Brand: 3M™ MEDIPORE™

## (undated) DEVICE — CONTROL SYRINGE LUER-LOCK TIP: Brand: MONOJECT

## (undated) DEVICE — STANDARD SURGICAL GOWN, L: Brand: CONVERTORS

## (undated) DEVICE — HEAD AND NECK PACK: Brand: CONVERTORS